# Patient Record
Sex: FEMALE | Race: OTHER | URBAN - METROPOLITAN AREA
[De-identification: names, ages, dates, MRNs, and addresses within clinical notes are randomized per-mention and may not be internally consistent; named-entity substitution may affect disease eponyms.]

---

## 2019-03-22 NOTE — PATIENT DISCUSSION
PROLIFERATIVE DIABETIC RETINOPATHY OU: SCHEDULE WITH RETINA SPECIALIST FOR EVALUATION AND TREATMENT.

## 2019-03-22 NOTE — PATIENT DISCUSSION
Proliferative Diabetic Retinopathy Counseling:  I have discussed with the patient the importance of controlling blood glucose, blood pressure and lipid levels  is necessary to minimize future retinal damage due to diabetes. Return for follow-up as scheduled.

## 2019-03-22 NOTE — PATIENT DISCUSSION
GONIO WAS ORDERED AND PERFORMED TODAY TO EVALUATE ANGLES FOR GLAUCOMA.  ANGLES ARE OPEN BUT NARROW TODAY

## 2019-03-27 NOTE — PATIENT DISCUSSION
POST OP OD: PT DOING WELL TODAY. RETINA IS ATTACHED. IOP IS WITHIN ACCEPTABLE LIMITS. VISION SHOULD START TO IMPROVE. IF ANY WORSENING OF VISION OR PAIN, PT IS TO CALL IMMEDIATELY. RETINAL DETACHMENT AND ENDOPHTHALMTIS PRECAUTIONS REVIEWED. INSTRUCTED PATIENT TO WEAR EYE SHIELD WHEN SLEEPING FOR THE FIRST WEEK AFTER SURGERY.

## 2019-04-05 NOTE — PATIENT DISCUSSION
POAG OU:  IOP STILL TOO HIGH TODAY. ADD ALPHAGAN TID OU AND CONTINUE COSOPT BID OU. TRYING TO STAY AWAY FROM PROSTAGLANDINS DUE TO THE RISK OF MACULAR EDEMA. WILL DO AN IOP CHECK IN 2 WEEKS. IF IOP IS STILL TOO HIGH ADD ANOTHER DROP.

## 2019-04-10 NOTE — PATIENT DISCUSSION
POST OP OD: PT DOING WELL TODAY. RETINA IS ATTACHED. IOP IS WITHIN ACCEPTABLE LIMITS. VISION SHOULD START TO IMPROVE. IF ANY WORSENING OF VISION OR PAIN, PT IS TO CALL IMMEDIATELY. RETINAL DETACHMENT AND ENDOPHTHALMTIS PRECAUTIONS REVIEWED.

## 2020-08-17 NOTE — PATIENT DISCUSSION
New Prescription: dorzolamide-timolol (dorzolamide-timolol): drops: 2-0.5% 1 drop twice a day into both eyes 08-

## 2021-02-02 ENCOUNTER — IMPORTED ENCOUNTER (OUTPATIENT)
Dept: URBAN - METROPOLITAN AREA CLINIC 9 | Facility: CLINIC | Age: 81
End: 2021-02-02

## 2021-02-02 NOTE — PATIENT DISCUSSION
Surgery Counseling: I have discussed the option of scheduling cataract surgery versus following, as well as the risks, benefits and alternatives of surgery with the patient. It was explained that the surgery is elective, there is no rush and there is no harm in waiting to have surgery. It was also explained that there is no guarantee that removing the cataract will improve their vision. The patient understands and desires to proceed with cataract surgery with implantation of an intraocular lens  to improve vision for _________.

## 2021-02-19 ENCOUNTER — IMPORTED ENCOUNTER (OUTPATIENT)
Dept: URBAN - METROPOLITAN AREA CLINIC 9 | Facility: CLINIC | Age: 81
End: 2021-02-19

## 2021-02-24 NOTE — PATIENT DISCUSSION
Surgery Drop Counseling:  I have prescribed Ofloxacin, Ketorolac and Prednisolone Acetate for use as directed before and after cataract surgery.

## 2021-02-24 NOTE — PATIENT DISCUSSION
[place] Dual Blade Counseling:  I have explained to the patient at length the diagnosis of primary open angle glaucoma and its pathophysiology. I have discussed the various treatment options including medications and surgery. I have discussed the option of the [place] Dual Blade at the time of cataract surgery should visual field testing indicate field loss. I have discussed the risks and benefits associated with [place] Dual blade at the time of cataract surgery. The [facility] Dual blade will benefit the patient's current glaucoma therapy in an attempt to prevent the need for maximum medical therapy. The patient is currently on ocular hypotensive medications to control their glaucoma. Patient understands and wishes to consider.

## 2021-02-24 NOTE — PATIENT DISCUSSION
PATIENT DESIRES STANDARD LENS OD FOR CAT SX. PT UNDERSTANDS SHE WILL NEED GLASSES FOR READING AND MAY NEED GLASSES FOR DISTANCE DUE TO ANY UNCORRECTED ASTIGMATISM.  PATIENT NEEDS VISION BLUE, PUPIL DILATORS, AND SUGARCAINE

## 2021-02-24 NOTE — PATIENT DISCUSSION
DUE TO DIABETES START DROPS 7 DAYS PRIOR TO SURGERY AND USE THE DROPS FOR 2 MONTHS AFTER SURGERY. DUE TO PAST RD IN OD WE DO NOT EXPECT THE VISION TO BE PERFECT AFTER SURGERY.

## 2021-02-24 NOTE — PATIENT DISCUSSION
WILL CHECK INTO INSURANCE TO SEE IF THE KDB IS COVERED, IF NOT COVERED BY INSURANCE AND PATIENT DOES NOT WANT TO PAY OUT OF POCKET FOR KDB WILL NOT DO IT AT THE TIME OF SURGERY.

## 2021-03-30 NOTE — PATIENT DISCUSSION
1 DAY S/P PHACO OD- HAD RETINAL DETACHMENT SO WE DO NOT KNOW WHAT HER POTENTIAL VISION WILL BE AFTER SURGERY.

## 2021-10-16 ASSESSMENT — TONOMETRY
OS_IOP_MMHG: 15
OD_IOP_MMHG: 12
OS_IOP_MMHG: 12
OD_IOP_MMHG: 15

## 2021-10-16 ASSESSMENT — VISUAL ACUITY
OS_SC: 20/30 SN
OD_PH: 20/30 SN
OS_SC: 20/30 SN

## 2022-02-04 ENCOUNTER — ESTABLISHED PATIENT (OUTPATIENT)
Dept: URBAN - METROPOLITAN AREA CLINIC 15 | Facility: CLINIC | Age: 82
End: 2022-02-04

## 2022-02-04 DIAGNOSIS — Z96.1: ICD-10-CM

## 2022-02-04 DIAGNOSIS — H40.1131: ICD-10-CM

## 2022-02-04 PROCEDURE — 92014 COMPRE OPH EXAM EST PT 1/>: CPT

## 2022-02-04 PROCEDURE — 92133 CPTRZD OPH DX IMG PST SGM ON: CPT

## 2022-02-04 ASSESSMENT — VISUAL ACUITY
OD_SC: J2
OS_SC: 20/25-2

## 2022-02-04 ASSESSMENT — TONOMETRY
OD_IOP_MMHG: 15
OS_IOP_MMHG: 16

## 2022-11-29 ENCOUNTER — ESTABLISHED PATIENT (OUTPATIENT)
Dept: URBAN - METROPOLITAN AREA CLINIC 15 | Facility: CLINIC | Age: 82
End: 2022-11-29

## 2022-11-29 DIAGNOSIS — H40.1131: ICD-10-CM

## 2022-11-29 DIAGNOSIS — Z96.1: ICD-10-CM

## 2022-11-29 PROCEDURE — 99214 OFFICE O/P EST MOD 30 MIN: CPT

## 2022-11-29 PROCEDURE — 92133 CPTRZD OPH DX IMG PST SGM ON: CPT

## 2022-11-29 PROCEDURE — 92083 EXTENDED VISUAL FIELD XM: CPT

## 2022-11-29 ASSESSMENT — TONOMETRY
OS_IOP_MMHG: 7
OD_IOP_MMHG: 11

## 2022-11-29 ASSESSMENT — VISUAL ACUITY
OD_SC: J1
OS_SC: 20/40-1